# Patient Record
Sex: FEMALE | Race: WHITE | NOT HISPANIC OR LATINO | URBAN - METROPOLITAN AREA
[De-identification: names, ages, dates, MRNs, and addresses within clinical notes are randomized per-mention and may not be internally consistent; named-entity substitution may affect disease eponyms.]

---

## 2020-07-25 ENCOUNTER — TELEPHONE ENCOUNTER (OUTPATIENT)
Dept: URBAN - METROPOLITAN AREA CLINIC 13 | Facility: CLINIC | Age: 60
End: 2020-07-25

## 2020-07-25 RX ORDER — METOCLOPRAMIDE 10 MG/1
TAKE 1 TABLET TWICE DAILY TABLET ORAL
Qty: 60 | Refills: 3 | OUTPATIENT
Start: 2016-06-17 | End: 2017-11-02

## 2020-07-25 RX ORDER — RIFAXIMIN 550 MG/1
TAKE 1 TABLET TWICE DAILY TABLET ORAL
Qty: 20 | Refills: 0 | OUTPATIENT
Start: 2018-05-14 | End: 2019-02-25

## 2020-07-25 RX ORDER — HYDROCORTISONE ACETATE 25 MG/1
INSERT 1 SUPP TWICE DAILY FOR 7 DAYS SUPPOSITORY RECTAL
Qty: 14 | Refills: 1 | OUTPATIENT
Start: 2011-12-12 | End: 2013-03-13

## 2020-07-25 RX ORDER — NITAZOXANIDE 500 MG/1
TAKE 1 TABLET TWICE DAILY TABLET ORAL
Qty: 10 | Refills: 0 | OUTPATIENT
Start: 2012-11-19 | End: 2013-03-13

## 2020-07-25 RX ORDER — NITAZOXANIDE 500 MG/1
TAKE 1 TABLET TWICE DAILY TABLET ORAL
Qty: 14 | Refills: 0 | OUTPATIENT
Start: 2012-06-05 | End: 2013-03-13

## 2020-07-25 RX ORDER — CIPROFLOXACIN HYDROCHLORIDE 500 MG/1
TAKE 1 TABLET TWICE DAILY TABLET, FILM COATED ORAL
Qty: 14 | Refills: 0 | OUTPATIENT
Start: 2017-11-02 | End: 2017-11-02

## 2020-07-25 RX ORDER — OXYCODONE AND ACETAMINOPHEN 7.5; 325 MG/1; MG/1
TAKE 1 TABLET EVERY 6 HOURS AS NEEDED FOR PAIN TABLET ORAL
Qty: 25 | Refills: 0 | OUTPATIENT
Start: 2013-03-25 | End: 2013-05-23

## 2020-07-25 RX ORDER — ATORVASTATIN CALCIUM 80 MG
TAKE 1 TABLET DAILY TABLET ORAL
Refills: 0 | OUTPATIENT
Start: 2009-01-13 | End: 2009-05-21

## 2020-07-25 RX ORDER — POLYETHYLENE GLYCOL 3350, SODIUM SULFATE, SODIUM CHLORIDE, POTASSIUM CHLORIDE, ASCORBIC ACID, SODIUM ASCORBATE 7.5-2.691G
DRINK HALF OF THE SOLUTION AT 5 PM THE DAY BEFORE PROCEDURE AND THEN LAST HALF 6 HOURS PRIOR TO PROCEDURE KIT ORAL
Qty: 1 | Refills: 0 | OUTPATIENT
Start: 2017-11-22 | End: 2019-02-25

## 2020-07-25 RX ORDER — RABEPRAZOLE SODIUM 20 MG/1
TAKE 1 TABLET DAILY TABLET, DELAYED RELEASE ORAL
Refills: 0 | OUTPATIENT
End: 2013-03-25

## 2020-07-25 RX ORDER — METRONIDAZOLE 500 MG/1
TAKE 1 TABLET 3 TIMES DAILY TABLET ORAL
Qty: 21 | Refills: 0 | OUTPATIENT
Start: 2017-11-02 | End: 2017-11-02

## 2020-07-25 RX ORDER — DEXLANSOPRAZOLE 60 MG/1
TAKE 1 CAPSULE DAILY EVERY MORNING BEFORE BREAKFAST CAPSULE, DELAYED RELEASE ORAL
Qty: 90 | Refills: 1 | OUTPATIENT
Start: 2015-11-02 | End: 2016-06-17

## 2020-07-25 RX ORDER — RIFAXIMIN 550 MG/1
TAKE 1 TABLET TWICE DAILY TABLET ORAL
Qty: 14 | Refills: 0 | OUTPATIENT
Start: 2015-05-28 | End: 2015-06-01

## 2020-07-25 RX ORDER — CLONAZEPAM 1 MG/1
TAKE 1 TABLET 3 TIMES DAILY AS NEEDED TABLET ORAL
Refills: 0 | OUTPATIENT
End: 2016-06-17

## 2020-07-25 RX ORDER — CHLORDIAZEPOXIDE HYDROCHLORIDE AND CLIDINIUM BROMIDE 5; 2.5 MG/1; MG/1
TAKE 1 CAPSULE EVERY 8 HOURS PRN ABDOMINAL PAIN CAPSULE ORAL
Qty: 45 | Refills: 1 | OUTPATIENT
Start: 2017-10-03 | End: 2017-11-02

## 2020-07-25 RX ORDER — CLONAZEPAM 0.5 MG/1
TAKE 1 TABLET DAILY AS NEEDED TABLET ORAL
Qty: 30 | Refills: 0 | OUTPATIENT
Start: 2012-06-11 | End: 2014-02-11

## 2020-07-25 RX ORDER — LACTOBACIL 2/BIFIDO 1/S.THERMO 450B CELL
TAKE 2 CAPSULE DAILY PACKET (EA) ORAL
Qty: 60 | Refills: 6 | OUTPATIENT
Start: 2012-11-20 | End: 2013-03-25

## 2020-07-25 RX ORDER — POLYETHYLENE GLYCOL 3350, SODIUM CHLORIDE, SODIUM BICARBONATE AND POTASSIUM CHLORIDE WITH LEMON FLAVOR 420; 11.2; 5.72; 1.48 G/4L; G/4L; G/4L; G/4L
TAKE AS DIRECTED POWDER, FOR SOLUTION ORAL
Qty: 1 | Refills: 0 | OUTPATIENT
Start: 2015-05-14 | End: 2015-06-01

## 2020-07-25 RX ORDER — NITAZOXANIDE 500 MG/1
TAKE 1 TABLET TWICE DAILY TABLET ORAL
Qty: 10 | Refills: 0 | OUTPATIENT
Start: 2011-11-23 | End: 2013-03-13

## 2020-07-25 RX ORDER — PHENOBARBITAL, HYOSCYAMINE SULFATE, ATROPINE SULFATE, SCOPOLAMINE HYDROBROMIDE 16.2; .1037; .0194; .0065 MG/5ML; MG/5ML; MG/5ML; MG/5ML
TAKE 5 ML 3 TIMES DAILY PRN ELIXIR ORAL
Qty: 1 | Refills: 1 | OUTPATIENT
Start: 2015-06-01 | End: 2016-06-17

## 2020-07-25 RX ORDER — METRONIDAZOLE 500 MG/1
TAKE 1 TABLET TWICE DAILY AS DIRECTED TABLET ORAL
Qty: 20 | Refills: 0 | OUTPATIENT
Start: 2014-02-11 | End: 2015-06-01

## 2020-07-25 RX ORDER — SUCRALFATE 1 G/1
TAKE 1 TABLET 3 TIMES DAILY TABLET ORAL
Refills: 0 | OUTPATIENT
End: 2017-11-02

## 2020-07-25 RX ORDER — ESTRADIOL 1 MG/1
TAKE 1 TABLET DAILY AS DIRECTED TABLET ORAL
Refills: 0 | OUTPATIENT
End: 2013-07-23

## 2020-07-26 ENCOUNTER — TELEPHONE ENCOUNTER (OUTPATIENT)
Dept: URBAN - METROPOLITAN AREA CLINIC 13 | Facility: CLINIC | Age: 60
End: 2020-07-26

## 2020-07-26 RX ORDER — METHENAMINE, SODIUM PHOSPHATE, MONOBASIC, MONOHYDRATE, PHENYL SALICYLATE, METHYLENE BLUE, AND HYOSCYAMINE SULFATE 118; 40.8; 36; 10; .12 MG/1; MG/1; MG/1; MG/1; MG/1
TAKE ONE CAPSULE BY MOUTH EVERY 8 HOURS AS NEEDED CAPSULE ORAL
Qty: 15 | Refills: 0 | Status: ACTIVE | COMMUNITY
Start: 2012-01-24

## 2020-07-26 RX ORDER — SUCRALFATE 1 G/10ML
SUSPENSION ORAL
Qty: 240 | Refills: 0 | Status: ACTIVE | COMMUNITY
Start: 2013-08-25

## 2020-07-26 RX ORDER — ESTRADIOL 0.1 MG/D
PLACE 2 PATCH WEEKLY PATCH TRANSDERMAL
Refills: 0 | Status: ACTIVE | COMMUNITY

## 2020-07-26 RX ORDER — TEMAZEPAM 30 MG/1
TAKE ONE CAPSULE AT BEDTIME AS NEEDED FOR SLEEP CAPSULE ORAL
Qty: 30 | Refills: 0 | Status: ACTIVE | COMMUNITY
Start: 2018-05-11

## 2020-07-26 RX ORDER — ESZOPICLONE 2 MG/1
TAKE ONE TABLET BY MOUTH AT BEDTIME AS NEEDED TABLET, FILM COATED ORAL
Qty: 30 | Refills: 0 | Status: ACTIVE | COMMUNITY
Start: 2018-04-06

## 2020-07-26 RX ORDER — ESCITALOPRAM 20 MG/1
TAKE 1 TABLET BY MOUTH EVERY DAY TABLET, FILM COATED ORAL
Qty: 30 | Refills: 0 | Status: ACTIVE | COMMUNITY
Start: 2012-06-11

## 2020-07-26 RX ORDER — ZOLPIDEM TARTRATE 10 MG/1
TABLET ORAL
Qty: 30 | Refills: 0 | Status: ACTIVE | COMMUNITY
Start: 2011-10-31

## 2020-07-26 RX ORDER — ATENOLOL 25 MG/1
TABLET ORAL
Qty: 30 | Refills: 0 | Status: ACTIVE | COMMUNITY
Start: 2011-07-28

## 2020-07-26 RX ORDER — HYDROMORPHONE HYDROCHLORIDE 2 MG/1
TAKE ONE TABLET EVERY 4 HOURS AS NEEDED TABLET ORAL
Qty: 30 | Refills: 0 | Status: ACTIVE | COMMUNITY
Start: 2018-05-21

## 2020-07-26 RX ORDER — LEVOFLOXACIN 500 MG/1
TABLET, FILM COATED ORAL
Qty: 7 | Refills: 0 | Status: ACTIVE | COMMUNITY
Start: 2011-12-16

## 2020-07-26 RX ORDER — HYDROXYZINE HYDROCHLORIDE 25 MG/1
TAKE 1 TABLET EVERY 6 HOURS AS NEEDED FOR ITCHING AND NAUSEA TABLET, FILM COATED ORAL
Qty: 30 | Refills: 0 | Status: ACTIVE | COMMUNITY
Start: 2018-09-11

## 2020-07-26 RX ORDER — HYDROCODONE BITARTRATE AND ACETAMINOPHEN 5; 325 MG/1; MG/1
TAKE 1 TO 2 TABLETS BY MOUTH 4 TIMES A DAY AS NEEDED TABLET ORAL
Qty: 40 | Refills: 0 | Status: ACTIVE | COMMUNITY
Start: 2011-12-03

## 2020-07-26 RX ORDER — PROGESTERONE 100 MG/1
CAPSULE ORAL
Qty: 30 | Refills: 0 | Status: ACTIVE | COMMUNITY
Start: 2013-05-10

## 2020-07-26 RX ORDER — PHENOBARBITAL, HYOSCYAMINE SULFATE, ATROPINE SULFATE, SCOPOLAMINE HYDROBROMIDE 16.2; .1037; .0194; .0065 MG/5ML; MG/5ML; MG/5ML; MG/5ML
ELIXIR ORAL
Qty: 255 | Refills: 0 | Status: ACTIVE | COMMUNITY
Start: 2013-08-25

## 2020-07-26 RX ORDER — DIAZEPAM 10 MG/1
INSERT 1 TABLET VAGINALLY AT BEDTIME TABLET ORAL
Qty: 90 | Refills: 0 | Status: ACTIVE | COMMUNITY
Start: 2012-10-02

## 2020-07-26 RX ORDER — ESTRADIOL 2 MG/1
TABLET ORAL
Qty: 90 | Refills: 0 | Status: ACTIVE | COMMUNITY
Start: 2013-02-01

## 2020-07-26 RX ORDER — LEVOFLOXACIN 500 MG/1
TABLET, FILM COATED ORAL
Qty: 10 | Refills: 0 | Status: ACTIVE | COMMUNITY
Start: 2012-05-24

## 2020-07-26 RX ORDER — LEVOFLOXACIN 500 MG/1
TABLET, FILM COATED ORAL
Qty: 7 | Refills: 0 | Status: ACTIVE | COMMUNITY
Start: 2013-01-02

## 2020-07-26 RX ORDER — ZOLPIDEM TARTRATE 5 MG/1
TAKE 1 TABLET AT  BEDTIME AS NEEDED FOR INSOMNIA TABLET, FILM COATED ORAL
Refills: 0 | Status: ACTIVE | COMMUNITY

## 2020-07-26 RX ORDER — NITROFURANTOIN MONOHYDRATE/MACROCRYSTALLINE 25; 75 MG/1; MG/1
CAPSULE ORAL
Qty: 14 | Refills: 0 | Status: ACTIVE | COMMUNITY
Start: 2013-11-07

## 2020-07-26 RX ORDER — ESTRADIOL 10 UG/1
INSERT VAGINAL
Qty: 18 | Refills: 0 | Status: ACTIVE | COMMUNITY
Start: 2012-03-12

## 2020-07-26 RX ORDER — HYDROCODONE POLISTIREX AND CHLORPHENIRAMINE POLISTIREX 10; 8 MG/5ML; MG/5ML
TAKE 1 TO 2 TEASPOONS (5-10ML) BY MOUTH TWICE DAILY AS NEEDED SUSPENSION, EXTENDED RELEASE ORAL
Qty: 240 | Refills: 0 | Status: ACTIVE | COMMUNITY
Start: 2018-03-23

## 2020-07-26 RX ORDER — LINACLOTIDE 290 UG/1
TAKE 1 CAPSULE BY MOUTH 30 MINUTES BEFORE BREAKFAST CAPSULE, GELATIN COATED ORAL
Qty: 30 | Refills: 11 | Status: ACTIVE | COMMUNITY
Start: 2019-02-25

## 2020-07-26 RX ORDER — ESTRADIOL 2 MG/1
TAKE 1 TABLET EVERY DAY TABLET ORAL
Qty: 90 | Refills: 0 | Status: ACTIVE | COMMUNITY
Start: 2012-07-25

## 2020-07-26 RX ORDER — ESCITALOPRAM 20 MG/1
TABLET, FILM COATED ORAL
Qty: 30 | Refills: 0 | Status: ACTIVE | COMMUNITY
Start: 2013-05-22

## 2020-07-26 RX ORDER — ESTRADIOL 0.1 MG/G
INSERT 1 GRAM TWICE WEEKLY CREAM VAGINAL
Qty: 42 | Refills: 0 | Status: ACTIVE | COMMUNITY
Start: 2018-03-07

## 2020-07-26 RX ORDER — DEXAMETHASONE 1.5 MG 1.5 MG/1
TABLET ORAL
Qty: 21 | Refills: 0 | Status: ACTIVE | COMMUNITY
Start: 2011-12-02

## 2020-07-26 RX ORDER — ESTRADIOL 0.1 MG/D
APPLY 1 PATCH AND REPLACE TWICE WEEKLY FILM, EXTENDED RELEASE TRANSDERMAL
Qty: 8 | Refills: 0 | Status: ACTIVE | COMMUNITY
Start: 2018-03-14

## 2020-07-26 RX ORDER — ESCITALOPRAM 20 MG/1
TABLET, FILM COATED ORAL
Qty: 30 | Refills: 0 | Status: ACTIVE | COMMUNITY
Start: 2013-01-15

## 2020-07-26 RX ORDER — ESCITALOPRAM 20 MG/1
TABLET, FILM COATED ORAL
Qty: 30 | Refills: 0 | Status: ACTIVE | COMMUNITY
Start: 2013-03-22

## 2020-07-26 RX ORDER — ESTRADIOL 0.1 MG/G
CREAM VAGINAL
Qty: 42 | Refills: 0 | Status: ACTIVE | COMMUNITY
Start: 2012-02-20

## 2020-07-26 RX ORDER — OXYCODONE AND ACETAMINOPHEN 10; 325 MG/1; MG/1
TABLET ORAL
Qty: 30 | Refills: 0 | Status: ACTIVE | COMMUNITY
Start: 2013-04-16

## 2020-12-15 ENCOUNTER — TELEPHONE ENCOUNTER (OUTPATIENT)
Dept: URBAN - METROPOLITAN AREA CLINIC 113 | Facility: CLINIC | Age: 60
End: 2020-12-15

## 2021-04-22 ENCOUNTER — DASHBOARD ENCOUNTERS (OUTPATIENT)
Age: 61
End: 2021-04-22

## 2021-04-22 ENCOUNTER — LAB OUTSIDE AN ENCOUNTER (OUTPATIENT)
Dept: URBAN - METROPOLITAN AREA CLINIC 113 | Facility: CLINIC | Age: 61
End: 2021-04-22

## 2021-04-22 ENCOUNTER — TELEPHONE ENCOUNTER (OUTPATIENT)
Dept: URBAN - METROPOLITAN AREA CLINIC 113 | Facility: CLINIC | Age: 61
End: 2021-04-22

## 2021-04-22 ENCOUNTER — WEB ENCOUNTER (OUTPATIENT)
Dept: URBAN - METROPOLITAN AREA CLINIC 107 | Facility: CLINIC | Age: 61
End: 2021-04-22

## 2021-04-22 ENCOUNTER — OFFICE VISIT (OUTPATIENT)
Dept: URBAN - METROPOLITAN AREA CLINIC 107 | Facility: CLINIC | Age: 61
End: 2021-04-22
Payer: COMMERCIAL

## 2021-04-22 VITALS
WEIGHT: 126 LBS | HEART RATE: 84 BPM | DIASTOLIC BLOOD PRESSURE: 60 MMHG | SYSTOLIC BLOOD PRESSURE: 111 MMHG | BODY MASS INDEX: 24.74 KG/M2 | TEMPERATURE: 98.2 F | RESPIRATION RATE: 18 BRPM | HEIGHT: 60 IN

## 2021-04-22 DIAGNOSIS — R13.10 ODYNOPHAGIA: ICD-10-CM

## 2021-04-22 DIAGNOSIS — K59.01 CONSTIPATION BY DELAYED COLONIC TRANSIT: ICD-10-CM

## 2021-04-22 DIAGNOSIS — K58.1 IRRITABLE BOWEL SYNDROME WITH CONSTIPATION: ICD-10-CM

## 2021-04-22 DIAGNOSIS — R10.13 EPIGASTRIC PAIN: ICD-10-CM

## 2021-04-22 PROBLEM — 440630006: Status: ACTIVE | Noted: 2021-04-22

## 2021-04-22 PROBLEM — 35298007: Status: ACTIVE | Noted: 2021-04-22

## 2021-04-22 PROCEDURE — 99213 OFFICE O/P EST LOW 20 MIN: CPT | Performed by: INTERNAL MEDICINE

## 2021-04-22 RX ORDER — ESTRADIOL 0.1 MG/G
_INSERT 1 GRAM TWICE WEEKLY CREAM VAGINAL
Qty: 42 | Refills: 0 | Status: ACTIVE | COMMUNITY
Start: 2018-03-07

## 2021-04-22 RX ORDER — ESCITALOPRAM 20 MG/1
TAKE 1 TABLET BY MOUTH EVERY DAY TABLET, FILM COATED ORAL
Qty: 30 | Refills: 0 | Status: ON HOLD | COMMUNITY
Start: 2012-06-11

## 2021-04-22 RX ORDER — ZOLPIDEM TARTRATE 10 MG/1
TABLET ORAL
Qty: 30 | Refills: 0 | Status: ON HOLD | COMMUNITY
Start: 2011-10-31

## 2021-04-22 RX ORDER — ESTRADIOL 2 MG/1
TAKE 1 TABLET EVERY DAY TABLET ORAL
Qty: 90 | Refills: 0 | Status: ON HOLD | COMMUNITY
Start: 2012-07-25

## 2021-04-22 RX ORDER — HYDROCODONE BITARTRATE AND ACETAMINOPHEN 5; 325 MG/1; MG/1
TAKE 1 TO 2 TABLETS BY MOUTH 4 TIMES A DAY AS NEEDED TABLET ORAL
Qty: 40 | Refills: 0 | Status: ON HOLD | COMMUNITY
Start: 2011-12-03

## 2021-04-22 RX ORDER — TEMAZEPAM 30 MG/1
TAKE ONE CAPSULE AT BEDTIME AS NEEDED FOR SLEEP CAPSULE ORAL
Qty: 30 | Refills: 0 | Status: ON HOLD | COMMUNITY
Start: 2018-05-11

## 2021-04-22 RX ORDER — ESTRADIOL 0.1 MG/G
CREAM VAGINAL
Qty: 42 | Refills: 0 | Status: ON HOLD | COMMUNITY
Start: 2012-02-20

## 2021-04-22 RX ORDER — ESTRADIOL 0.1 MG/D
APPLY 1 PATCH AND REPLACE TWICE WEEKLY FILM, EXTENDED RELEASE TRANSDERMAL
Qty: 8 | Refills: 0 | Status: ON HOLD | COMMUNITY
Start: 2018-03-14

## 2021-04-22 RX ORDER — HYDROMORPHONE HYDROCHLORIDE 2 MG/1
TAKE ONE TABLET EVERY 4 HOURS AS NEEDED TABLET ORAL
Qty: 30 | Refills: 0 | Status: ON HOLD | COMMUNITY
Start: 2018-05-21

## 2021-04-22 RX ORDER — ESTRADIOL 10 UG/1
INSERT VAGINAL
Qty: 18 | Refills: 0 | Status: ON HOLD | COMMUNITY
Start: 2012-03-12

## 2021-04-22 RX ORDER — ATENOLOL 25 MG/1
TABLET ORAL
Qty: 30 | Refills: 0 | Status: ON HOLD | COMMUNITY
Start: 2011-07-28

## 2021-04-22 RX ORDER — NITROFURANTOIN MONOHYDRATE/MACROCRYSTALLINE 25; 75 MG/1; MG/1
CAPSULE ORAL
Qty: 14 | Refills: 0 | Status: ON HOLD | COMMUNITY
Start: 2013-11-07

## 2021-04-22 RX ORDER — ESTRADIOL 0.1 MG/D
PLACE 2 PATCH WEEKLY PATCH TRANSDERMAL
Refills: 0 | Status: ACTIVE | COMMUNITY

## 2021-04-22 RX ORDER — ESZOPICLONE 2 MG/1
TAKE ONE TABLET BY MOUTH AT BEDTIME AS NEEDED TABLET, FILM COATED ORAL
Qty: 30 | Refills: 0 | Status: ON HOLD | COMMUNITY
Start: 2018-04-06

## 2021-04-22 RX ORDER — LEVOFLOXACIN 500 MG/1
TABLET, FILM COATED ORAL
Qty: 7 | Refills: 0 | Status: ON HOLD | COMMUNITY
Start: 2011-12-16

## 2021-04-22 RX ORDER — SUCRALFATE 1 G/10ML
SUSPENSION ORAL
Qty: 240 | Refills: 0 | Status: ON HOLD | COMMUNITY
Start: 2013-08-25

## 2021-04-22 RX ORDER — DEXAMETHASONE 1.5 MG 1.5 MG/1
TABLET ORAL
Qty: 21 | Refills: 0 | Status: ON HOLD | COMMUNITY
Start: 2011-12-02

## 2021-04-22 RX ORDER — PHENOBARBITAL, HYOSCYAMINE SULFATE, ATROPINE SULFATE, SCOPOLAMINE HYDROBROMIDE 16.2; .1037; .0194; .0065 MG/5ML; MG/5ML; MG/5ML; MG/5ML
ELIXIR ORAL
Qty: 255 | Refills: 0 | Status: ON HOLD | COMMUNITY
Start: 2013-08-25

## 2021-04-22 RX ORDER — ZOLPIDEM TARTRATE 5 MG/1
TAKE 1 TABLET AT  BEDTIME AS NEEDED FOR INSOMNIA TABLET, FILM COATED ORAL
Refills: 0 | Status: ACTIVE | COMMUNITY

## 2021-04-22 RX ORDER — DIAZEPAM 10 MG/1
_INSERT 1 TABLET VAGINALLY AT BEDTIME TABLET ORAL
Qty: 90 | Refills: 0 | Status: ON HOLD | COMMUNITY
Start: 2012-10-02

## 2021-04-22 RX ORDER — OXYCODONE AND ACETAMINOPHEN 10; 325 MG/1; MG/1
TABLET ORAL
Qty: 30 | Refills: 0 | Status: ON HOLD | COMMUNITY
Start: 2013-04-16

## 2021-04-22 RX ORDER — LINACLOTIDE 290 UG/1
TAKE 1 CAPSULE BY MOUTH 30 MINUTES BEFORE BREAKFAST CAPSULE, GELATIN COATED ORAL
Qty: 30 | Refills: 11 | Status: ON HOLD | COMMUNITY
Start: 2019-02-25

## 2021-04-22 RX ORDER — HYDROCODONE POLISTIREX AND CHLORPHENIRAMINE POLISTIREX 10; 8 MG/5ML; MG/5ML
TAKE 1 TO 2 TEASPOONS (5-10ML) BY MOUTH TWICE DAILY AS NEEDED SUSPENSION, EXTENDED RELEASE ORAL
Qty: 240 | Refills: 0 | Status: ON HOLD | COMMUNITY
Start: 2018-03-23

## 2021-04-22 RX ORDER — HYDROXYZINE HYDROCHLORIDE 25 MG/1
TAKE 1 TABLET EVERY 6 HOURS AS NEEDED FOR ITCHING AND NAUSEA TABLET, FILM COATED ORAL
Qty: 30 | Refills: 0 | Status: ON HOLD | COMMUNITY
Start: 2018-09-11

## 2021-04-22 RX ORDER — PROGESTERONE 100 MG/1
CAPSULE ORAL
Qty: 30 | Refills: 0 | Status: ON HOLD | COMMUNITY
Start: 2013-05-10

## 2021-04-22 RX ORDER — METHENAMINE, SODIUM PHOSPHATE, MONOBASIC, MONOHYDRATE, PHENYL SALICYLATE, METHYLENE BLUE, AND HYOSCYAMINE SULFATE 118; 40.8; 36; 10; .12 MG/1; MG/1; MG/1; MG/1; MG/1
TAKE ONE CAPSULE BY MOUTH EVERY 8 HOURS AS NEEDED CAPSULE ORAL
Qty: 15 | Refills: 0 | Status: ON HOLD | COMMUNITY
Start: 2012-01-24

## 2021-04-22 NOTE — EXAM-PHYSICAL EXAM
She is alert and oriented to person place and situation in no acute distress.  There is no scleral icterus.  Neck is supple without adenopathy or thyromegaly.

## 2021-04-22 NOTE — HPI-TODAY'S VISIT:
Patient is a very pleasant 61-year-old female who comes in with epigastric pain and odynophagia.  She was last seen in the office in February 2019.  I followed her for irritable bowel syndrome.  These have mostly presented as upper gastrointestinal symptoms such as nonulcer dyspepsia, belching along with lower symptoms of bloating.  Last upper endoscopy was in September 2013.  This revealed a few gastric polyps but was otherwise unremarkable.  Last colonoscopy was September 2018.  This was for screening purposes.  The exam was unremarkable.  CT scan in March 2019 with contrast was unremarkable.  Repeat CT scan in December 2020 with contrast was unremarkable. The patient comes in urgently for new symptomatology.  Over the past couple days she has developed a progressive odynophagia and epigastric pain.  Is now very difficult to even drink water.  She does have intermittent mouth ulcers.  She also noted recently a over-the-counter pill becomes stuck.  In addition she has had increased constipation over the last month.  She took magnesium citrate about 2 weeks ago and this led to nausea diaphoresis syncope and severe abdominal pain.  She had a small hard bowel movement this morning.  She

## 2021-04-23 ENCOUNTER — OFFICE VISIT (OUTPATIENT)
Dept: URBAN - METROPOLITAN AREA MEDICAL CENTER 43 | Facility: MEDICAL CENTER | Age: 61
End: 2021-04-23
Payer: COMMERCIAL

## 2021-04-23 DIAGNOSIS — R13.10 ABNORMAL SWALLOWING: ICD-10-CM

## 2021-04-23 DIAGNOSIS — K31.89 ACQUIRED DEFORMITY OF DUODENUM: ICD-10-CM

## 2021-04-23 DIAGNOSIS — R10.13 ABDOMINAL DISCOMFORT, EPIGASTRIC: ICD-10-CM

## 2021-04-23 PROBLEM — 30233002: Status: ACTIVE | Noted: 2021-04-22

## 2021-04-23 PROCEDURE — 43239 EGD BIOPSY SINGLE/MULTIPLE: CPT | Performed by: INTERNAL MEDICINE

## 2022-10-24 ENCOUNTER — TELEPHONE ENCOUNTER (OUTPATIENT)
Dept: URBAN - METROPOLITAN AREA CLINIC 113 | Facility: CLINIC | Age: 62
End: 2022-10-24

## 2022-10-24 RX ORDER — AMITRIPTYLINE HYDROCHLORIDE 10 MG/1
1 TABLET AT BEDTIME TABLET, FILM COATED ORAL ONCE A DAY
Qty: 30 TABLET | Refills: 3 | OUTPATIENT
Start: 2022-10-24

## 2023-08-04 ENCOUNTER — TELEPHONE ENCOUNTER (OUTPATIENT)
Dept: URBAN - METROPOLITAN AREA CLINIC 113 | Facility: CLINIC | Age: 63
End: 2023-08-04

## 2023-08-04 ENCOUNTER — LAB OUTSIDE AN ENCOUNTER (OUTPATIENT)
Dept: URBAN - METROPOLITAN AREA CLINIC 113 | Facility: CLINIC | Age: 63
End: 2023-08-04

## 2023-08-09 ENCOUNTER — OFFICE VISIT (OUTPATIENT)
Dept: URBAN - METROPOLITAN AREA MEDICAL CENTER 2 | Facility: MEDICAL CENTER | Age: 63
End: 2023-08-09
Payer: COMMERCIAL

## 2023-08-09 DIAGNOSIS — K31.89 ACQUIRED DEFORMITY OF DUODENUM: ICD-10-CM

## 2023-08-09 DIAGNOSIS — R10.13 ABDOMINAL DISCOMFORT, EPIGASTRIC: ICD-10-CM

## 2023-08-09 DIAGNOSIS — R93.3 ABN FINDINGS-GI TRACT: ICD-10-CM

## 2023-08-09 PROCEDURE — 43239 EGD BIOPSY SINGLE/MULTIPLE: CPT | Performed by: INTERNAL MEDICINE

## 2023-08-14 ENCOUNTER — OFFICE VISIT (OUTPATIENT)
Dept: URBAN - METROPOLITAN AREA CLINIC 107 | Facility: CLINIC | Age: 63
End: 2023-08-14

## 2023-08-14 RX ORDER — HYDROXYZINE HYDROCHLORIDE 25 MG/1
TAKE 1 TABLET EVERY 6 HOURS AS NEEDED FOR ITCHING AND NAUSEA TABLET, FILM COATED ORAL
Qty: 30 | Refills: 0 | Status: ON HOLD | COMMUNITY
Start: 2018-09-11

## 2023-08-14 RX ORDER — ESTRADIOL 0.1 MG/D
PLACE 2 PATCH WEEKLY PATCH TRANSDERMAL
Refills: 0 | Status: ACTIVE | COMMUNITY

## 2023-08-14 RX ORDER — LINACLOTIDE 290 UG/1
TAKE 1 CAPSULE BY MOUTH 30 MINUTES BEFORE BREAKFAST CAPSULE, GELATIN COATED ORAL
Qty: 30 | Refills: 11 | Status: ON HOLD | COMMUNITY
Start: 2019-02-25

## 2023-08-14 RX ORDER — ZOLPIDEM TARTRATE 10 MG/1
TABLET ORAL
Qty: 30 | Refills: 0 | Status: ON HOLD | COMMUNITY
Start: 2011-10-31

## 2023-08-14 RX ORDER — SUCRALFATE 1 G/10ML
SUSPENSION ORAL
Qty: 240 | Refills: 0 | Status: ON HOLD | COMMUNITY
Start: 2013-08-25

## 2023-08-14 RX ORDER — HYDROCODONE POLISTIREX AND CHLORPHENIRAMINE POLISTIREX 10; 8 MG/5ML; MG/5ML
TAKE 1 TO 2 TEASPOONS (5-10ML) BY MOUTH TWICE DAILY AS NEEDED SUSPENSION, EXTENDED RELEASE ORAL
Qty: 240 | Refills: 0 | Status: ON HOLD | COMMUNITY
Start: 2018-03-23

## 2023-08-14 RX ORDER — ESTRADIOL 2 MG/1
TAKE 1 TABLET EVERY DAY TABLET ORAL
Qty: 90 | Refills: 0 | Status: ON HOLD | COMMUNITY
Start: 2012-07-25

## 2023-08-14 RX ORDER — PROGESTERONE 100 MG/1
CAPSULE ORAL
Qty: 30 | Refills: 0 | Status: ON HOLD | COMMUNITY
Start: 2013-05-10

## 2023-08-14 RX ORDER — ESZOPICLONE 2 MG/1
TAKE ONE TABLET BY MOUTH AT BEDTIME AS NEEDED TABLET, FILM COATED ORAL
Qty: 30 | Refills: 0 | Status: ON HOLD | COMMUNITY
Start: 2018-04-06

## 2023-08-14 RX ORDER — HYDROMORPHONE HYDROCHLORIDE 2 MG/1
TAKE ONE TABLET EVERY 4 HOURS AS NEEDED TABLET ORAL
Qty: 30 | Refills: 0 | Status: ON HOLD | COMMUNITY
Start: 2018-05-21

## 2023-08-14 RX ORDER — ZOLPIDEM TARTRATE 5 MG/1
TAKE 1 TABLET AT  BEDTIME AS NEEDED FOR INSOMNIA TABLET, FILM COATED ORAL
Refills: 0 | Status: ACTIVE | COMMUNITY

## 2023-08-14 RX ORDER — DIAZEPAM 10 MG/1
_INSERT 1 TABLET VAGINALLY AT BEDTIME TABLET ORAL
Qty: 90 | Refills: 0 | Status: ON HOLD | COMMUNITY
Start: 2012-10-02

## 2023-08-14 RX ORDER — NITROFURANTOIN MONOHYDRATE/MACROCRYSTALLINE 25; 75 MG/1; MG/1
CAPSULE ORAL
Qty: 14 | Refills: 0 | Status: ON HOLD | COMMUNITY
Start: 2013-11-07

## 2023-08-14 RX ORDER — ESTRADIOL 10 UG/1
INSERT VAGINAL
Qty: 18 | Refills: 0 | Status: ON HOLD | COMMUNITY
Start: 2012-03-12

## 2023-08-14 RX ORDER — TEMAZEPAM 30 MG/1
TAKE ONE CAPSULE AT BEDTIME AS NEEDED FOR SLEEP CAPSULE ORAL
Qty: 30 | Refills: 0 | Status: ON HOLD | COMMUNITY
Start: 2018-05-11

## 2023-08-14 RX ORDER — AMITRIPTYLINE HYDROCHLORIDE 10 MG/1
1 TABLET AT BEDTIME TABLET, FILM COATED ORAL ONCE A DAY
Qty: 30 TABLET | Refills: 3 | Status: ACTIVE | COMMUNITY
Start: 2022-10-24

## 2023-08-14 RX ORDER — DEXAMETHASONE 1.5 MG 1.5 MG/1
TABLET ORAL
Qty: 21 | Refills: 0 | Status: ON HOLD | COMMUNITY
Start: 2011-12-02

## 2023-08-14 RX ORDER — HYDROCODONE BITARTRATE AND ACETAMINOPHEN 5; 325 MG/1; MG/1
TAKE 1 TO 2 TABLETS BY MOUTH 4 TIMES A DAY AS NEEDED TABLET ORAL
Qty: 40 | Refills: 0 | Status: ON HOLD | COMMUNITY
Start: 2011-12-03

## 2023-08-14 RX ORDER — ESCITALOPRAM 20 MG/1
TAKE 1 TABLET BY MOUTH EVERY DAY TABLET, FILM COATED ORAL
Qty: 30 | Refills: 0 | Status: ON HOLD | COMMUNITY
Start: 2012-06-11

## 2023-08-14 RX ORDER — OXYCODONE AND ACETAMINOPHEN 10; 325 MG/1; MG/1
TABLET ORAL
Qty: 30 | Refills: 0 | Status: ON HOLD | COMMUNITY
Start: 2013-04-16

## 2023-08-14 RX ORDER — ESTRADIOL 0.1 MG/G
CREAM VAGINAL
Qty: 42 | Refills: 0 | Status: ON HOLD | COMMUNITY
Start: 2012-02-20

## 2023-08-14 RX ORDER — METHENAMINE, SODIUM PHOSPHATE, MONOBASIC, MONOHYDRATE, PHENYL SALICYLATE, METHYLENE BLUE, AND HYOSCYAMINE SULFATE 118; 40.8; 36; 10; .12 MG/1; MG/1; MG/1; MG/1; MG/1
TAKE ONE CAPSULE BY MOUTH EVERY 8 HOURS AS NEEDED CAPSULE ORAL
Qty: 15 | Refills: 0 | Status: ON HOLD | COMMUNITY
Start: 2012-01-24

## 2023-08-14 RX ORDER — ESTRADIOL 0.1 MG/D
APPLY 1 PATCH AND REPLACE TWICE WEEKLY FILM, EXTENDED RELEASE TRANSDERMAL
Qty: 8 | Refills: 0 | Status: ON HOLD | COMMUNITY
Start: 2018-03-14

## 2023-08-14 RX ORDER — LEVOFLOXACIN 500 MG/1
TABLET, FILM COATED ORAL
Qty: 7 | Refills: 0 | Status: ON HOLD | COMMUNITY
Start: 2011-12-16

## 2023-08-14 RX ORDER — ATENOLOL 25 MG/1
TABLET ORAL
Qty: 30 | Refills: 0 | Status: ON HOLD | COMMUNITY
Start: 2011-07-28

## 2023-08-14 RX ORDER — PHENOBARBITAL, HYOSCYAMINE SULFATE, ATROPINE SULFATE, SCOPOLAMINE HYDROBROMIDE 16.2; .1037; .0194; .0065 MG/5ML; MG/5ML; MG/5ML; MG/5ML
ELIXIR ORAL
Qty: 255 | Refills: 0 | Status: ON HOLD | COMMUNITY
Start: 2013-08-25

## 2023-08-14 RX ORDER — ESTRADIOL 0.1 MG/G
_INSERT 1 GRAM TWICE WEEKLY CREAM VAGINAL
Qty: 42 | Refills: 0 | Status: ACTIVE | COMMUNITY
Start: 2018-03-07

## 2023-08-14 NOTE — HPI-TODAY'S VISIT:
Patient is a very pleasant 61-year-old female who comes in with epigastric pain and odynophagia.  She was last seen in the office in February 2019.  I followed her for irritable bowel syndrome.  These have mostly presented as upper gastrointestinal symptoms such as nonulcer dyspepsia, belching along with lower symptoms of bloating.  Last upper endoscopy was in September 2013.  This revealed a few gastric polyps but was otherwise unremarkable.  Last colonoscopy was September 2018.  This was for screening purposes.  The exam was unremarkable.  CT scan in March 2019 with contrast was unremarkable.  Repeat CT scan in December 2020 with contrast was unremarkable. The patient comes in urgently for new symptomatology.  Over the past couple days she has developed a progressive odynophagia and epigastric pain.  Is now very difficult to even drink water.  She does have intermittent mouth ulcers.  She also noted recently a over-the-counter pill becomes stuck.  In addition she has had increased constipation over the last month.  She took magnesium citrate about 2 weeks ago and this led to nausea diaphoresis syncope and severe abdominal pain.  She had a small hard bowel movement this morning.  She Interval history, 8/14/2023.  Upper endoscopy performed last week for epigastric pain and abnormal CT scan was unremarkable.  Biopsies were also unremarkable.  Laboratory testing performed 2 weeks ago revealed a normal CBC with no increased eosinophils, normal sed rate, normal CMP, normal lipase.  CT scan of the abdomen and pelvis performed 2 weeks ago was unremarkable other than "thickening of the pylorus with a distended stomach which led to the above upper endoscopy.

## 2024-03-13 ENCOUNTER — OV EP (OUTPATIENT)
Dept: URBAN - METROPOLITAN AREA CLINIC 113 | Facility: CLINIC | Age: 64
End: 2024-03-13
Payer: COMMERCIAL

## 2024-03-13 ENCOUNTER — LAB (OUTPATIENT)
Dept: URBAN - METROPOLITAN AREA CLINIC 113 | Facility: CLINIC | Age: 64
End: 2024-03-13

## 2024-03-13 VITALS
HEART RATE: 79 BPM | BODY MASS INDEX: 19.29 KG/M2 | WEIGHT: 120 LBS | SYSTOLIC BLOOD PRESSURE: 110 MMHG | TEMPERATURE: 97.1 F | DIASTOLIC BLOOD PRESSURE: 70 MMHG | HEIGHT: 66 IN | RESPIRATION RATE: 18 BRPM

## 2024-03-13 DIAGNOSIS — R10.13 EPIGASTRIC ABDOMINAL PAIN: ICD-10-CM

## 2024-03-13 DIAGNOSIS — R14.2 BELCHING: ICD-10-CM

## 2024-03-13 DIAGNOSIS — K58.1 IRRITABLE BOWEL SYNDROME WITH CONSTIPATION: ICD-10-CM

## 2024-03-13 PROCEDURE — 99214 OFFICE O/P EST MOD 30 MIN: CPT | Performed by: INTERNAL MEDICINE

## 2024-03-13 RX ORDER — SUCRALFATE 1 G/10ML
SUSPENSION ORAL
Qty: 240 | Refills: 0 | Status: ON HOLD | COMMUNITY
Start: 2013-08-25

## 2024-03-13 RX ORDER — TEMAZEPAM 30 MG/1
TAKE ONE CAPSULE AT BEDTIME AS NEEDED FOR SLEEP CAPSULE ORAL
Qty: 30 | Refills: 0 | Status: ON HOLD | COMMUNITY
Start: 2018-05-11

## 2024-03-13 RX ORDER — ESTRADIOL 0.1 MG/G
CREAM VAGINAL
Qty: 42 | Refills: 0 | Status: ON HOLD | COMMUNITY
Start: 2012-02-20

## 2024-03-13 RX ORDER — METHENAMINE, SODIUM PHOSPHATE, MONOBASIC, MONOHYDRATE, PHENYL SALICYLATE, METHYLENE BLUE, AND HYOSCYAMINE SULFATE 118; 40.8; 36; 10; .12 MG/1; MG/1; MG/1; MG/1; MG/1
TAKE ONE CAPSULE BY MOUTH EVERY 8 HOURS AS NEEDED CAPSULE ORAL
Qty: 15 | Refills: 0 | Status: ON HOLD | COMMUNITY
Start: 2012-01-24

## 2024-03-13 RX ORDER — HYDROCODONE POLISTIREX AND CHLORPHENIRAMINE POLISTIREX 10; 8 MG/5ML; MG/5ML
TAKE 1 TO 2 TEASPOONS (5-10ML) BY MOUTH TWICE DAILY AS NEEDED SUSPENSION, EXTENDED RELEASE ORAL
Qty: 240 | Refills: 0 | Status: ON HOLD | COMMUNITY
Start: 2018-03-23

## 2024-03-13 RX ORDER — ESZOPICLONE 2 MG/1
TAKE ONE TABLET BY MOUTH AT BEDTIME AS NEEDED TABLET, FILM COATED ORAL
Qty: 30 | Refills: 0 | Status: ON HOLD | COMMUNITY
Start: 2018-04-06

## 2024-03-13 RX ORDER — ZOLPIDEM TARTRATE 10 MG/1
TABLET ORAL
Qty: 30 | Refills: 0 | Status: ON HOLD | COMMUNITY
Start: 2011-10-31

## 2024-03-13 RX ORDER — ESTRADIOL 10 UG/1
INSERT VAGINAL
Qty: 18 | Refills: 0 | Status: ON HOLD | COMMUNITY
Start: 2012-03-12

## 2024-03-13 RX ORDER — PROGESTERONE 100 MG/1
CAPSULE ORAL
Qty: 30 | Refills: 0 | Status: ON HOLD | COMMUNITY
Start: 2013-05-10

## 2024-03-13 RX ORDER — HYDROMORPHONE HYDROCHLORIDE 2 MG/1
TAKE ONE TABLET EVERY 4 HOURS AS NEEDED TABLET ORAL
Qty: 30 | Refills: 0 | Status: ON HOLD | COMMUNITY
Start: 2018-05-21

## 2024-03-13 RX ORDER — ESTRADIOL 2 MG/1
TAKE 1 TABLET EVERY DAY TABLET ORAL
Qty: 90 | Refills: 0 | Status: ON HOLD | COMMUNITY
Start: 2012-07-25

## 2024-03-13 RX ORDER — ESTRADIOL 0.1 MG/D
PLACE 2 PATCH WEEKLY PATCH TRANSDERMAL
Refills: 0 | Status: ACTIVE | COMMUNITY

## 2024-03-13 RX ORDER — OXYCODONE AND ACETAMINOPHEN 10; 325 MG/1; MG/1
TABLET ORAL
Qty: 30 | Refills: 0 | Status: ON HOLD | COMMUNITY
Start: 2013-04-16

## 2024-03-13 RX ORDER — AMITRIPTYLINE HYDROCHLORIDE 10 MG/1
1 TABLET AT BEDTIME TABLET, FILM COATED ORAL ONCE A DAY
Qty: 30 TABLET | Refills: 3 | Status: ACTIVE | COMMUNITY
Start: 2022-10-24

## 2024-03-13 RX ORDER — DEXAMETHASONE 1.5 MG 1.5 MG/1
TABLET ORAL
Qty: 21 | Refills: 0 | Status: ON HOLD | COMMUNITY
Start: 2011-12-02

## 2024-03-13 RX ORDER — ESTRADIOL 0.1 MG/D
APPLY 1 PATCH AND REPLACE TWICE WEEKLY FILM, EXTENDED RELEASE TRANSDERMAL
Qty: 8 | Refills: 0 | Status: ON HOLD | COMMUNITY
Start: 2018-03-14

## 2024-03-13 RX ORDER — OMEPRAZOLE 20 MG/1
1 CAPSULE 30 MINUTES BEFORE MORNING MEAL CAPSULE, DELAYED RELEASE ORAL ONCE A DAY
Qty: 90 | Refills: 3 | OUTPATIENT
Start: 2024-03-13

## 2024-03-13 RX ORDER — PHENOBARBITAL, HYOSCYAMINE SULFATE, ATROPINE SULFATE, SCOPOLAMINE HYDROBROMIDE 16.2; .1037; .0194; .0065 MG/5ML; MG/5ML; MG/5ML; MG/5ML
ELIXIR ORAL
Qty: 255 | Refills: 0 | Status: ON HOLD | COMMUNITY
Start: 2013-08-25

## 2024-03-13 RX ORDER — ATENOLOL 25 MG/1
TABLET ORAL
Qty: 30 | Refills: 0 | Status: ON HOLD | COMMUNITY
Start: 2011-07-28

## 2024-03-13 RX ORDER — LEVOFLOXACIN 500 MG/1
TABLET, FILM COATED ORAL
Qty: 7 | Refills: 0 | Status: ON HOLD | COMMUNITY
Start: 2011-12-16

## 2024-03-13 RX ORDER — PLECANATIDE 3 MG/1
1 TABLET TABLET ORAL ONCE A DAY
Qty: 90 TABLET | Refills: 3 | OUTPATIENT
Start: 2024-03-13 | End: 2025-03-07

## 2024-03-13 RX ORDER — ESCITALOPRAM 20 MG/1
TAKE 1 TABLET BY MOUTH EVERY DAY TABLET, FILM COATED ORAL
Qty: 30 | Refills: 0 | Status: ON HOLD | COMMUNITY
Start: 2012-06-11

## 2024-03-13 RX ORDER — CHLORDIAZEPOXIDE HYDROCHLORIDE AND CLIDINIUM BROMIDE 5; 2.5 MG/1; MG/1
1 CAPSULE BEFORE MEALS CAPSULE ORAL
Qty: 60 | Refills: 1 | OUTPATIENT
Start: 2024-03-13 | End: 2024-05-12

## 2024-03-13 RX ORDER — ESTRADIOL 0.1 MG/G
_INSERT 1 GRAM TWICE WEEKLY CREAM VAGINAL
Qty: 42 | Refills: 0 | Status: ACTIVE | COMMUNITY
Start: 2018-03-07

## 2024-03-13 RX ORDER — ZOLPIDEM TARTRATE 5 MG/1
TAKE 1 TABLET AT  BEDTIME AS NEEDED FOR INSOMNIA TABLET, FILM COATED ORAL
Refills: 0 | Status: ACTIVE | COMMUNITY

## 2024-03-13 RX ORDER — HYDROCODONE BITARTRATE AND ACETAMINOPHEN 5; 325 MG/1; MG/1
TAKE 1 TO 2 TABLETS BY MOUTH 4 TIMES A DAY AS NEEDED TABLET ORAL
Qty: 40 | Refills: 0 | Status: ON HOLD | COMMUNITY
Start: 2011-12-03

## 2024-03-13 RX ORDER — DIAZEPAM 10 MG/1
_INSERT 1 TABLET VAGINALLY AT BEDTIME TABLET ORAL
Qty: 90 | Refills: 0 | Status: ON HOLD | COMMUNITY
Start: 2012-10-02

## 2024-03-13 RX ORDER — HYDROXYZINE HYDROCHLORIDE 25 MG/1
TAKE 1 TABLET EVERY 6 HOURS AS NEEDED FOR ITCHING AND NAUSEA TABLET, FILM COATED ORAL
Qty: 30 | Refills: 0 | Status: ON HOLD | COMMUNITY
Start: 2018-09-11

## 2024-03-13 RX ORDER — NITROFURANTOIN MONOHYDRATE/MACROCRYSTALLINE 25; 75 MG/1; MG/1
CAPSULE ORAL
Qty: 14 | Refills: 0 | Status: ON HOLD | COMMUNITY
Start: 2013-11-07

## 2024-03-13 RX ORDER — LINACLOTIDE 290 UG/1
TAKE 1 CAPSULE BY MOUTH 30 MINUTES BEFORE BREAKFAST CAPSULE, GELATIN COATED ORAL
Qty: 30 | Refills: 11 | Status: ON HOLD | COMMUNITY
Start: 2019-02-25

## 2024-03-13 NOTE — EXAM-PHYSICAL EXAM
She is alert and oriented to person place and situation no acute distress.  Abdomen is soft nondistended nontender without masses or organomegaly.

## 2024-03-13 NOTE — HPI-TODAY'S VISIT:
Patient is a very pleasant 61-year-old female who comes in with epigastric pain and odynophagia.  She was last seen in the office in February 2019.  I followed her for irritable bowel syndrome.  These have mostly presented as upper gastrointestinal symptoms such as nonulcer dyspepsia, belching along with lower symptoms of bloating.  Last upper endoscopy was in September 2013.  This revealed a few gastric polyps but was otherwise unremarkable.  Last colonoscopy was September 2018.  This was for screening purposes.  The exam was unremarkable.  CT scan in March 2019 with contrast was unremarkable.  Repeat CT scan in December 2020 with contrast was unremarkable. The patient comes in urgently for new symptomatology.  Over the past couple days she has developed a progressive odynophagia and epigastric pain.  Is now very difficult to even drink water.  She does have intermittent mouth ulcers.  She also noted recently a over-the-counter pill becomes stuck.  In addition she has had increased constipation over the last month.  She took magnesium citrate about 2 weeks ago and this led to nausea diaphoresis syncope and severe abdominal pain.  She had a small hard bowel movement this morning.  She Interval history, 8/14/2023.  Upper endoscopy performed last week for epigastric pain and abnormal CT scan was unremarkable.  Biopsies were also unremarkable.  Laboratory testing performed 2 weeks ago revealed a normal CBC with no increased eosinophils, normal sed rate, normal CMP, normal lipase.  CT scan of the abdomen and pelvis performed 2 weeks ago was unremarkable other than "thickening of the pylorus with a distended stomach which led to the above upper endoscopy. Interval history, 3/13/2024.  Upper endoscopy performed August 9, 2023 for epigastric abdominal pain and abnormal CT scan was unremarkable.  Biopsies of the stomach at that time were unremarkable.  CT scan of the abdomen and pelvis in August 2023 with contrast revealed thickening of the pylorus with distended stomach but otherwise unremarkable. The patient states she continues to have significant abdominal pain bloating belching and epigastric discomfort.  She is taken GoLytely and cleaned out her bowels without success in relieving her symptoms.  She took Xifaxan.  The GoLytely and Xifaxan transiently helped but now her symptoms are returning.  Linzess works too well.  She is on Carafate.

## 2024-06-17 ENCOUNTER — OFFICE VISIT (OUTPATIENT)
Dept: URBAN - METROPOLITAN AREA CLINIC 113 | Facility: CLINIC | Age: 64
End: 2024-06-17